# Patient Record
(demographics unavailable — no encounter records)

---

## 2024-11-08 NOTE — PHYSICAL EXAM
[Alert] : alert [Well Nourished] : well nourished [No Acute Distress] : no acute distress [Well Developed] : well developed [Normal Sclera/Conjunctiva] : normal sclera/conjunctiva [EOMI] : extra ocular movement intact [No Proptosis] : no proptosis [Thyroid Not Enlarged] : the thyroid was not enlarged [No Thyroid Nodules] : no palpable thyroid nodules [No Respiratory Distress] : no respiratory distress [No Accessory Muscle Use] : no accessory muscle use [Clear to Auscultation] : lungs were clear to auscultation bilaterally [Normal S1, S2] : normal S1 and S2 [Normal Rate] : heart rate was normal [Regular Rhythm] : with a regular rhythm [Normal Anterior Cervical Nodes] : no anterior cervical lymphadenopathy [Normal Posterior Cervical Nodes] : no posterior cervical lymphadenopathy [Acanthosis Nigricans] : no acanthosis nigricans [Oriented x3] : oriented to person, place, and time [Normal Affect] : the affect was normal [Normal Mood] : the mood was normal

## 2024-11-08 NOTE — HISTORY OF PRESENT ILLNESS
[FreeTextEntry1] : Here for T2DM c/b L , vit D def, hypothyroidism, HLD Was hospitalized at [facility] -- had syncope possibly related to vasovagal symptoms from valsalva maneuver due to constipation related to Ozempic/ Found to have colitis with bleeding  INTERVAL HISTORY: off Qsymia, was having hair loss and was not losing much weight.  Achilles tendonitis leading to hip and knee pain, tries to walk for exercise but finds it difficult.  Current regimen for glycemic control: none  Past meds: off metformin d/t diarrhea and stomach pains even on 1 tab. Victoza caused diarrhea. Trulicity caused weight gain. Ozempic caused constipation issues. Had yeast infection on SGLT2 inhibitor but was on antibiotics for colitis at the time.  Current B, ate 1 slice of Rye bread and chicken cutlet with saba 2 hours ago SMBG twice weekly, fasting in AM. Reports BG is usually 170s.  Diet: snacks a lot at night. During the day, doesn't eat much.  B- Cereal/oatmeal L- half sandwich, yogurt, cottage cheese. D- chicken, fish, vegetables, hard boiled eggs. S- cookies Exercise: has stationary bike but is not consistent. Tries to walk but has hip and knee pain from Achilles tendonitis. Weight gained 6 pounds back recently, s/p gastric sleeve  after which she lost about 50 pounds.  Eye exam: 2024, no  Reports in chart. Left cataract surgery in May 2023 Feet: saw podiatrist , denies neuropathy. Kidney disease: none Heart disease: none -------------------------- Type: autoimmune hypothyroidism Duration: 25 years Onset: feeling tired all the time Hx of autoimmune disease: none FH of thyroid disease: whole family with thyroid issues FH of thyroid cancer: daughter with thyroid cancer FH of autoimmune disease: none Radiation exposure: none  Sonogram? none  Current regimen: Unithroid KELLY 150 mcg x 2 days per week, 175 mcg x 5 days per week

## 2024-11-08 NOTE — ASSESSMENT
[FreeTextEntry1] : 73 year old female with T2DM with associated retinopathy, hypothyroidism, obesity s/p gastric sleeve, hyperlipidemia, and vitamin D deficiency. Glycemic control has worsened since coming off GLP1 agonist due to constipation.  1. T2DM with retinopathy- worsening, A1c up to 8.2%. -No GLP1 agonist due to constipation. -Difficulty affording meds, was on patient assistance of Ozempic. -Trial of Farxiga 5 mg daily x 1 month. Given samples. Yeast infection in the past may have been related to antibiotics. -Lifestyle modifications- diet, exercise, and weight loss- to improve glycemic control. Discussed forms of exercise. -Repeat A1c and RTO for follow-up with MD in 3 months. -TRACY normal November 2024. Eye exam UTD.  2. Hypothyroidism- euthyroid on replacement T4. -Continue current dose of LT4. -Repeat TFTs in 3 months.  3. Hyperlipidemia- Chol 242, . Has been taking statin QOD. -Take statin daily! -Low fat diet. -Repeat lipids in 3 months.  4. Vitamin D deficiency- resolved. -Continue supplement.  -Repeat level in 3 months.  5. Obesity s/p gastric sleeve- did not tolerate GLP1 agonist or Qsymia. -Lifestyle modifications- diet and exercise- as above.

## 2024-11-08 NOTE — REVIEW OF SYSTEMS
[Fatigue] : fatigue [Recent Weight Gain (___ Lbs)] : recent weight gain: [unfilled] lbs [Heartburn] : heartburn [Depression] : depression [Chest Pain] : no chest pain [Palpitations] : no palpitations [Shortness Of Breath] : no shortness of breath [Nausea] : no nausea [Constipation] : no constipation [Abdominal Pain] : no abdominal pain [Vomiting] : no vomiting [Diarrhea] : no diarrhea [Polyuria] : no polyuria [Tremors] : no tremors [Pain/Numbness of Digits] : no pain/numbness of digits [Anxiety] : no anxiety [Polydipsia] : no polydipsia [Cold Intolerance] : no cold intolerance [Heat Intolerance] : no heat intolerance

## 2025-02-25 NOTE — HISTORY OF PRESENT ILLNESS
[FreeTextEntry1] : here for T2DM cb L , vit d def, hypothyroidism, HLD was hospitalized at [facility] - had syncope possibly related to vasovagal symptoms from valsalva maneuver due to constipation related to ozempic. found to have colitis with bleeding   obesity s/p gastric sleeve   interval history: chart reviewed labs reviewed- tsh 0.16, a1c 5.9, miles neg  told me that my other patient passed away- theron decker ( from colonic perforation) started qysmia with bariatric surgeon, Dr. Bajwa, a month ago  regimen: mounjaro 5mg weekly Unithroid KELLY 150 mcg x 2 days per week, 175 mcg x 5 days per week crestor 10mg daily- taking consistently  past meds off metformin causes diarrhea and stomach pains even on 1 tab victoza caused diarrhea trulicity caused weight gain off ozempic due to constipation issues (but patient reported to me that she was hospitalized for syncope) off farxiga, self dced due to "starting to get itchy"  FS in office 97  diet: doing weight watchers. has cookies at night exercise: not walking bc of feet, has shoulder issues. has a stationary bike at home that she is not using weight stable  eye doctor: up to date, , jessy GREGORY, left cataract surgery in may 2023

## 2025-02-25 NOTE — REASON FOR VISIT
[Follow-Up: _____] : a [unfilled] follow-up visit [FreeTextEntry1] : T2DM cb L DR, hypothyroidism, HLD, vitamin D deficiency

## 2025-02-25 NOTE — ASSESSMENT
[FreeTextEntry1] : 74F w/ obesity s/p gastric sleeve, T2DM cb L DR, hypothyroidism, HLD and vitamin D deficiency here for follow up rct in 3 months with np with labs rtc in 6months with labs  Hypothyroidism- tsh off again due to weight loss.  change to unithroid emily 150 x3 days and unithroid EMILY 175mcg x4days, takes appropriately.  T2DM- improved on glp1, A1c goal <6.5. seeing eye doctor Q6 months due to L  continue mounjaro 5mg weekly, no need to increase past meds:  unable to tolerate metformin at all. victoza also causing diarrhea now. trulicity caused weight gain. off ozempic due to constipation and attempted to try again but patient had recurrent symptoms and stopped it herself farxiga caused pruritis so she d/eliza  HLD- LDL goal <70, close to goal, at high risk-dm. has some tolerable myalgias. continue crestor 20mg daily  Vitamin D deficiency- continue vitamin d supp  Obesity- s/p gastric sleeve. continue diet.  seeing bariatric surgeon Dr. Bajwa for qysmia.  continue stationary bike.  discussed again that she can try gluten free diet to try to lose weight.

## 2025-02-25 NOTE — PHYSICAL EXAM
[Alert] : alert [Well Nourished] : well nourished [No Acute Distress] : no acute distress [Well Developed] : well developed [Normal Sclera/Conjunctiva] : normal sclera/conjunctiva [EOMI] : extra ocular movement intact [No Proptosis] : no proptosis [Normal Oropharynx] : the oropharynx was normal [Thyroid Not Enlarged] : the thyroid was not enlarged [No Thyroid Nodules] : no palpable thyroid nodules [No Respiratory Distress] : no respiratory distress [No Accessory Muscle Use] : no accessory muscle use [Clear to Auscultation] : lungs were clear to auscultation bilaterally [Normal S1, S2] : normal S1 and S2 [Normal Rate] : heart rate was normal [Regular Rhythm] : with a regular rhythm [No Edema] : no peripheral edema [Pedal Pulses Normal] : the pedal pulses are present [Normal Bowel Sounds] : normal bowel sounds [Not Tender] : non-tender [Not Distended] : not distended [Soft] : abdomen soft [No Stigmata of Cushings Syndrome] : no stigmata of Cushings Syndrome [Normal Gait] : normal gait [Normal Strength/Tone] : muscle strength and tone were normal [No Rash] : no rash [Normal Reflexes] : deep tendon reflexes were 2+ and symmetric [No Tremors] : no tremors [Oriented x3] : oriented to person, place, and time [Normal Affect] : the affect was normal [Normal Mood] : the mood was normal [Acanthosis Nigricans] : no acanthosis nigricans [Delayed in the Right Toes] : normal in the toes [Delayed in the Left Toes] : normal in the toes

## 2025-05-16 NOTE — HISTORY OF PRESENT ILLNESS
[FreeTextEntry1] : Here for T2DM c/b L , vit D def, hypothyroidism, HLD Was hospitalized at [facility] -- had syncope possibly related to vasovagal symptoms from valsalva maneuver due to Constipation related to Ozempic/ Found to have colitis with bleeding  INTERVAL HISTORY: off Qsymia, was having hair loss and was not losing much weight. Achilles tendonitis leading to hip and knee pain--->symptoms improving, started walking again  Current regimen for glycemic control: Mounjaro 5 mg weekly (started 2024), occasional mild nausea on day of injection. Denies constipation.  Past meds: off metformin d/t diarrhea and stomach pains even on 1 tab. Victoza caused diarrhea. Trulicity caused weight gain. Ozempic caused constipation issues. Had yeast infection on SGLT2 inhibitor but was on antibiotics for colitis at the time, did a second trial of Farxiga but started to get itchy so stopped takin git.  Current B SMBG twice weekly, fasting in AM. Reports FBG is 80-90s.  Diet: Feeling more hungry in the morning, feels like appetite suppressive effects of Mounjaro are waning. Less snacking at night. Trying to eat more fruit. B- Cereal/oatmeal L- half sandwich, yogurt, cottage cheese. D- chicken, fish, vegetables, hard boiled eggs. S- cookies Exercise: started walking 1 mile QOD. Weight: s/p gastric sleeve  after which she lost about 50 pounds. Lost about 20 pounds since starting Mounjaro 6 months ago.  Eye exam: May 2025, no DR. Report in chart. Feet: saw podiatrist , denies neuropathy. Kidney disease: none Heart disease: none -------------------------- Type: autoimmune hypothyroidism Duration: 25 years Onset: feeling tired all the time Hx of autoimmune disease: none FH of thyroid disease: whole family with thyroid issues FH of thyroid cancer: daughter with thyroid cancer FH of autoimmune disease: none Radiation exposure: none  Sonogram? none  Current regimen: Unithroid KELLY 150 mcg x 3 days per week, 175 mcg x 4 days per week

## 2025-05-16 NOTE — PHYSICAL EXAM
[Alert] : alert [Well Nourished] : well nourished [No Acute Distress] : no acute distress [Well Developed] : well developed [Normal Sclera/Conjunctiva] : normal sclera/conjunctiva [EOMI] : extra ocular movement intact [No Proptosis] : no proptosis [Thyroid Not Enlarged] : the thyroid was not enlarged [No Thyroid Nodules] : no palpable thyroid nodules [No Respiratory Distress] : no respiratory distress [No Accessory Muscle Use] : no accessory muscle use [Clear to Auscultation] : lungs were clear to auscultation bilaterally [Normal S1, S2] : normal S1 and S2 [Normal Rate] : heart rate was normal [Regular Rhythm] : with a regular rhythm [Normal Anterior Cervical Nodes] : no anterior cervical lymphadenopathy [Oriented x3] : oriented to person, place, and time [Normal Affect] : the affect was normal [Normal Mood] : the mood was normal [Acanthosis Nigricans] : no acanthosis nigricans

## 2025-05-16 NOTE — ASSESSMENT
[FreeTextEntry1] : 73 year old female with T2DM with associated retinopathy, hypothyroidism, obesity s/p gastric sleeve, hyperlipidemia, and vitamin D deficiency. Glycemic control has improved.  1. T2DM- A1c 5.7%. -Increase Mounjaro to 7.5 mg weekly for glycemic control and better effects on weight loss. -Lifestyle modifications- diet, exercise, and weight loss- to improve glycemic control. Discussed forms of exercise. -Repeat A1c and RTO for follow-up with MD in 3 months. -TRACY normal May 2025. Eye exam UTD. -No metformin d/t diarrhea and stomach pains even on 1 tab. Victoza caused diarrhea. Trulicity caused weight gain. Ozempic caused constipation issues. Had yeast infection on SGLT2 inhibitor but was on antibiotics for colitis at the time, did a second trial of Farxiga but started to get itchy so stopped takin git.  2. Hypothyroidism- TSH remains suppressed, needs dose adjustment likely due to weight loss. -Decrease Unithroid (KELLY) to150 mcg daily. -Repeat TFTs in 3 months.  3. Hyperlipidemia- improving, chol 164 with LDL 74 -Continue rosuvastatin 20 mg daily. -Low fat diet. -Repeat lipids in 3 months.  4. Obesity s/p gastric sleeve- did not tolerate Qsymia. -Lifestyle modifications- diet and exercise- as above. -Mounjaro 7.5 mg weekly as above.

## 2025-07-24 NOTE — REVIEW OF SYSTEMS
[Insomnia] : insomnia [Depression] : depression [Negative] : Gastrointestinal [Dysuria] : no dysuria [Hematuria] : no hematuria [Joint Pain] : no joint pain [Muscle Pain] : no muscle pain [Skin Rash] : no skin rash [Headache] : no headache [Suicidal] : not suicidal [Anxiety] : no anxiety

## 2025-07-24 NOTE — SIGNATURES
[TextEntry] : Kaitlyn Lux D.O.  Family Medicine [place] [address] [phone]

## 2025-07-24 NOTE — SIGNATURES
[TextEntry] : Kaitlyn Lux D.O.  Family Medicine [place]ician [place] [address] [phone]

## 2025-07-24 NOTE — HISTORY OF PRESENT ILLNESS
[FreeTextEntry1] : CPE [de-identified] : Ms. KAUR TODD is a 74 year old female who presents to the office for CPE

## 2025-07-24 NOTE — HISTORY OF PRESENT ILLNESS
[FreeTextEntry1] : CPE [de-identified] : Ms. KARU TODD is a 74 year old female who presents to the office for CPE

## 2025-07-24 NOTE — HEALTH RISK ASSESSMENT
[Yes] : Yes [Monthly or less (1 pt)] : Monthly or less (1 point) [1 or 2 (0 pts)] : 1 or 2 (0 points) [Never (0 pts)] : Never (0 points) [No] : In the past 12 months have you used drugs other than those required for medical reasons? No [Little interest or pleasure doing things] : 1) Little interest or pleasure doing things [Feeling down, depressed, or hopeless] : 2) Feeling down, depressed, or hopeless [0] : 2) Feeling down, depressed, or hopeless: Not at all (0) [PHQ-2 Negative - No further assessment needed] : PHQ-2 Negative - No further assessment needed [Time Spent: ___ Minutes] : I spent [unfilled] minutes performing a depression screening for this patient. [Former] : Former [0-4] : 0-4 [< 15 Years] : < 15 Years [NO] : No [2] : 2 [None] : None [With Family] : lives with family [Retired] : retired [] :  [Feels Safe at Home] : Feels safe at home [Fully functional (bathing, dressing, toileting, transferring, walking, feeding)] : Fully functional (bathing, dressing, toileting, transferring, walking, feeding) [Fully functional (using the telephone, shopping, preparing meals, housekeeping, doing laundry, using] : Fully functional and needs no help or supervision to perform IADLs (using the telephone, shopping, preparing meals, housekeeping, doing laundry, using transportation, managing medications and managing finances) [Smoke Detector] : smoke detector [Carbon Monoxide Detector] : carbon monoxide detector [Seat Belt] :  uses seat belt [Sunscreen] : uses sunscreen [Intercurrent ED visits] : went to ED [No falls in past year] : Patient reported no falls in the past year [Patient reported mammogram was normal] : Patient reported mammogram was normal [Patient reported bone density results were normal] : Patient reported bone density results were normal [Patient reported colonoscopy was normal] : Patient reported colonoscopy was normal [No Retinopathy] : No retinopathy [Very Good] : ~his/her~  mood as very good [de-identified] : [facility] _ COLITIS after mounjaro increase 06/2025 [de-identified] : [facility]  [de-identified] : walking [de-identified] : well-balanced.  [[id number]] : 0 [de-identified] : 0.5ppd x 30 years  [LowDoseCTScan] : 02/05/2024 [EyeExamDate] : 05/2025 [Change in mental status noted] : No change in mental status noted [Reports changes in vision] : Reports no changes in vision [Reports changes in dental health] : Reports no changes in dental health [MammogramDate] : 10/2024 [MammogramComments] : BI-RADS 2 [PapSmearComments] : hysterectomy 1990s [BoneDensityDate] : 2021 [ColonoscopyDate] : 2025 [ColonoscopyComments] : Dr. Hernandez

## 2025-07-24 NOTE — COUNSELING
[Fall prevention counseling provided] : Fall prevention counseling provided [Adequate lighting] : Adequate lighting [No throw rugs] : No throw rugs [Use proper foot wear] : Use proper foot wear [Behavioral health counseling provided] : Behavioral health counseling provided [Sleep ___ hours/day] : Sleep [unfilled] hours/day [Engage in a relaxing activity] : Engage in a relaxing activity [Plan in advance] : Plan in advance [Encouraged to maintain food diary] : Encouraged to maintain food diary [Encouraged to increase physical activity] : Encouraged to increase physical activity [Decrease Portions] : decrease portions [____ min/wk Activity] : [unfilled] min/wk activity [Keep Food Diary] : keep food diary [None] : None [Good understanding] : Patient has a good understanding of lifestyle changes and steps needed to achieve self management goal

## 2025-07-24 NOTE — ASSESSMENT
[Vaccines Reviewed] : Immunizations reviewed today. Please see immunization details in the vaccine log within the immunization flowsheet.  [FreeTextEntry1] : Annual physical/wellness visit performed today Routine measurements including height, weight, BMI, and blood pressure performed. Medications reviewed and reconciled.  Tobacco, alcohol, and drug screening performed.  Annual depression screening performed.  Diet and exercise discussed.  Routine screening labs ordered.   Preventative Services checklist reviewed including:  Annual Flu Vaccine- due FALL 2025 Vaccines- UTD Bone Density Exam- due  Colorectal Screening- UTD Mammogram- due, script given  Pap Smear- hx of hysterectomy  Lung CA screening: due, script given  IN office hearing: failed b/l  In office EKG: NSR, no acute ischemia, compared to prior.

## 2025-07-24 NOTE — HEALTH RISK ASSESSMENT
[Yes] : Yes [Monthly or less (1 pt)] : Monthly or less (1 point) [1 or 2 (0 pts)] : 1 or 2 (0 points) [Never (0 pts)] : Never (0 points) [No] : In the past 12 months have you used drugs other than those required for medical reasons? No [Little interest or pleasure doing things] : 1) Little interest or pleasure doing things [Feeling down, depressed, or hopeless] : 2) Feeling down, depressed, or hopeless [0] : 2) Feeling down, depressed, or hopeless: Not at all (0) [PHQ-2 Negative - No further assessment needed] : PHQ-2 Negative - No further assessment needed [Time Spent: ___ Minutes] : I spent [unfilled] minutes performing a depression screening for this patient. [Former] : Former [0-4] : 0-4 [< 15 Years] : < 15 Years [NO] : No [2] : 2 [None] : None [With Family] : lives with family [Retired] : retired [] :  [Feels Safe at Home] : Feels safe at home [Fully functional (bathing, dressing, toileting, transferring, walking, feeding)] : Fully functional (bathing, dressing, toileting, transferring, walking, feeding) [Fully functional (using the telephone, shopping, preparing meals, housekeeping, doing laundry, using] : Fully functional and needs no help or supervision to perform IADLs (using the telephone, shopping, preparing meals, housekeeping, doing laundry, using transportation, managing medications and managing finances) [Smoke Detector] : smoke detector [Carbon Monoxide Detector] : carbon monoxide detector [Seat Belt] :  uses seat belt [Sunscreen] : uses sunscreen [Intercurrent ED visits] : went to ED [No falls in past year] : Patient reported no falls in the past year [Patient reported mammogram was normal] : Patient reported mammogram was normal [Patient reported bone density results were normal] : Patient reported bone density results were normal [Patient reported colonoscopy was normal] : Patient reported colonoscopy was normal [No Retinopathy] : No retinopathy [Very Good] : ~his/her~  mood as very good [de-identified] : [facility] _ COLITIS after mounjaro increase 06/2025 [de-identified] : [facility] GI  [de-identified] : walking [de-identified] : well-balanced.  [[id number]] : 0 [de-identified] : 0.5ppd x 30 years  [LowDoseCTScan] : 02/05/2024 [EyeExamDate] : 05/2025 [Change in mental status noted] : No change in mental status noted [Reports changes in vision] : Reports no changes in vision [Reports changes in dental health] : Reports no changes in dental health [MammogramDate] : 10/2024 [MammogramComments] : BI-RADS 2 [PapSmearComments] : hysterectomy 1990s [BoneDensityDate] : 2021 [ColonoscopyDate] : 2025 [ColonoscopyComments] : Dr. Hernandez